# Patient Record
Sex: FEMALE | Race: WHITE | URBAN - METROPOLITAN AREA
[De-identification: names, ages, dates, MRNs, and addresses within clinical notes are randomized per-mention and may not be internally consistent; named-entity substitution may affect disease eponyms.]

---

## 2022-04-10 ENCOUNTER — HISTORICAL (OUTPATIENT)
Dept: ADMINISTRATIVE | Facility: HOSPITAL | Age: 42
End: 2022-04-10

## 2022-04-11 LAB
APPEARANCE, UA: CLEAR
B-HCG SERPL QL: NEGATIVE
BILIRUB UR QL STRIP: NEGATIVE
COLOR UR: YELLOW
DO MICRO?: NO
GLUCOSE (UA): NEGATIVE
HCG UR QL IA.RAPID: NEGATIVE
HCG UR QL IA.RAPID: POSITIVE
HGB UR QL STRIP: NORMAL
KETONES UR QL STRIP: NEGATIVE
LEUKOCYTE ESTERASE UR QL STRIP: NEGATIVE
NITRITE UR QL STRIP: NEGATIVE
PH UR STRIP: 6.5 [PH] (ref 4.6–8)
PROT UR QL STRIP: NEGATIVE
SARS-COV-2 AG RESP QL IA.RAPID: NOT DETECTED
SP GR UR STRIP: 1.01 (ref 1–1.03)
UROBILINOGEN UR STRIP-ACNC: 1

## 2022-04-12 ENCOUNTER — HISTORICAL (OUTPATIENT)
Dept: ANESTHESIOLOGY | Facility: HOSPITAL | Age: 42
End: 2022-04-12

## 2022-04-27 VITALS
DIASTOLIC BLOOD PRESSURE: 90 MMHG | BODY MASS INDEX: 30.31 KG/M2 | SYSTOLIC BLOOD PRESSURE: 140 MMHG | WEIGHT: 171.06 LBS | HEIGHT: 63 IN

## 2022-05-14 NOTE — OP NOTE
Patient:   Edelmira Levi             MRN: [id number]            FIN: [id number]               Age:   42 years     Sex:  Female     :  1980   Associated Diagnoses:   Hemorrhoid   Author:   Lennie Lynn MD      Operative Note   Operative Information   Date/ Time:  2022 09:50:00.     Procedures Performed: Procedure Code   Hemorrhoidectomy, internal and external, 2 or more columns/groups; (22077)..     Indications: 42-year-old white female with complaint of symptomatic bleeding hemorrhoids electing to undergo excisional hemorrhoidectomy regions of concern located at the 4:00 and 7 o'clock position..     Preoperative Diagnosis: Hemorrhoid (RCL08-KC K64.9).     Postoperative Diagnosis: Hemorrhoid (DWR96-KC K64.9).     Surgeon: Lennie Lynn MD.     Anesthesia: General and spinal.     Speciman Removed: 1.  External/internal hemorrhoidal component from the 4 o'clock position  2.  External/internal hemorrhoidal component from the 7 o'clock position.     Description of Procedure/Findings/    Complications: Patient was brought to the operative theater spinal anesthesia was provided intravenous MAC anesthesia.  Preoperative antibiotics administered.  Patient was then placed in the prone jackknife position.  The area of the anus and perineum were then sterilely prepped and draped in normal surgical fashion using Betadine.  The anal canal was examined in a 360 degree view.  The most prominent and inflamed regions located the 4 to 7 o'clock position as previously noted.  These regions were then infiltrated 1% lidocaine and epinephrine.  Starting at the external verge of the anal canal the skin was incised with a #15 blade and dissection was carried down from distal to proximal position using Metzenbaum scissors.  The vascular poles of the internal and external hemorrhoidal components were then dissected and ligated using hand-held LigaSure device.  This was performed to both regions of the for the 7  o'clock position.  The mucosa was then reapproximated in a running locking fashion using 3-0 chromic.  A segment of Gelfoam gauze soaked with lidocaine jelly was then placed in the anus for postoperative pain control.  Patient was then relieved of anesthesia stable condition and transferred postanesthesia care unit..     Esimated blood loss: loss  5  cc.     Findings: Moderately inflamed and enlarged internal/external hemorrhoidal components located at the 4 and 7 o'clock position of the anal canal.     Complications: None.